# Patient Record
Sex: FEMALE
[De-identification: names, ages, dates, MRNs, and addresses within clinical notes are randomized per-mention and may not be internally consistent; named-entity substitution may affect disease eponyms.]

---

## 2022-08-04 ENCOUNTER — P2P PATIENT RECORD (OUTPATIENT)
Age: 47
End: 2022-08-04

## 2022-09-06 ENCOUNTER — LAB OUTSIDE AN ENCOUNTER (OUTPATIENT)
Dept: URBAN - METROPOLITAN AREA CLINIC 63 | Facility: CLINIC | Age: 47
End: 2022-09-06

## 2022-09-06 ENCOUNTER — WEB ENCOUNTER (OUTPATIENT)
Dept: URBAN - METROPOLITAN AREA CLINIC 63 | Facility: CLINIC | Age: 47
End: 2022-09-06

## 2022-09-06 ENCOUNTER — OFFICE VISIT (OUTPATIENT)
Dept: URBAN - METROPOLITAN AREA CLINIC 63 | Facility: CLINIC | Age: 47
End: 2022-09-06
Payer: COMMERCIAL

## 2022-09-06 VITALS
DIASTOLIC BLOOD PRESSURE: 80 MMHG | BODY MASS INDEX: 42.02 KG/M2 | TEMPERATURE: 97.2 F | SYSTOLIC BLOOD PRESSURE: 122 MMHG | HEART RATE: 71 BPM | WEIGHT: 252.2 LBS | OXYGEN SATURATION: 96 % | HEIGHT: 65 IN

## 2022-09-06 DIAGNOSIS — K52.9 CHRONIC DIARRHEA: ICD-10-CM

## 2022-09-06 DIAGNOSIS — K75.81 NASH (NONALCOHOLIC STEATOHEPATITIS): ICD-10-CM

## 2022-09-06 DIAGNOSIS — E78.2 MODERATE MIXED HYPERLIPIDEMIA NOT REQUIRING STATIN THERAPY: ICD-10-CM

## 2022-09-06 DIAGNOSIS — D50.9 IRON DEFICIENCY ANEMIA, UNSPECIFIED IRON DEFICIENCY ANEMIA TYPE: ICD-10-CM

## 2022-09-06 PROBLEM — 267434003: Status: ACTIVE | Noted: 2022-09-06

## 2022-09-06 PROCEDURE — 99204 OFFICE O/P NEW MOD 45 MIN: CPT | Performed by: INTERNAL MEDICINE

## 2022-09-06 NOTE — HPI-TODAY'S VISIT:
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  She saw her primary care physician and labs were ordered and she was found to have a mild iron deficiency anemia.  Her iron saturation was 11 and her hemoglobin was 12.2.  Also of significance is that she had a C-reactive protein of 9.9 and an MARY which was positive with a 1-320 titer.  She has not seen blood in the stool she not had any specific weight loss

## 2022-10-10 PROBLEM — 442685003: Status: ACTIVE | Noted: 2022-09-06

## 2022-10-14 ENCOUNTER — CLAIMS CREATED FROM THE CLAIM WINDOW (OUTPATIENT)
Dept: URBAN - METROPOLITAN AREA CLINIC 4 | Facility: CLINIC | Age: 47
End: 2022-10-14
Payer: COMMERCIAL

## 2022-10-14 ENCOUNTER — OFFICE VISIT (OUTPATIENT)
Dept: URBAN - METROPOLITAN AREA SURGERY CENTER 4 | Facility: SURGERY CENTER | Age: 47
End: 2022-10-14
Payer: COMMERCIAL

## 2022-10-14 ENCOUNTER — TELEPHONE ENCOUNTER (OUTPATIENT)
Dept: URBAN - METROPOLITAN AREA CLINIC 63 | Facility: CLINIC | Age: 47
End: 2022-10-14

## 2022-10-14 DIAGNOSIS — K64.0 GRADE I INTERNAL HEMORRHOIDS: ICD-10-CM

## 2022-10-14 DIAGNOSIS — D50.9 IRON DEFICIENCY ANEMIA, UNSPECIFIED IRON DEFICIENCY ANEMIA TYPE: ICD-10-CM

## 2022-10-14 DIAGNOSIS — K63.89 OTHER SPECIFIED DISEASES OF INTESTINE: ICD-10-CM

## 2022-10-14 DIAGNOSIS — K29.70 GASTRITIS, UNSPECIFIED, WITHOUT BLEEDING: ICD-10-CM

## 2022-10-14 DIAGNOSIS — R12 HEARTBURN: ICD-10-CM

## 2022-10-14 DIAGNOSIS — K31.89 OTHER DISEASES OF STOMACH AND DUODENUM: ICD-10-CM

## 2022-10-14 DIAGNOSIS — K29.51 UNSPECIFIED CHRONIC GASTRITIS WITH BLEEDING: ICD-10-CM

## 2022-10-14 PROBLEM — 95530000: Status: ACTIVE | Noted: 2022-10-14

## 2022-10-14 PROCEDURE — 45380 COLONOSCOPY AND BIOPSY: CPT | Performed by: INTERNAL MEDICINE

## 2022-10-14 PROCEDURE — 88312 SPECIAL STAINS GROUP 1: CPT | Performed by: PATHOLOGY

## 2022-10-14 PROCEDURE — G8907 PT DOC NO EVENTS ON DISCHARG: HCPCS | Performed by: INTERNAL MEDICINE

## 2022-10-14 PROCEDURE — 88313 SPECIAL STAINS GROUP 2: CPT | Performed by: PATHOLOGY

## 2022-10-14 PROCEDURE — 88342 IMHCHEM/IMCYTCHM 1ST ANTB: CPT | Performed by: PATHOLOGY

## 2022-10-14 PROCEDURE — G8918 PT W/O PREOP ORDER IV AB PRO: HCPCS | Performed by: INTERNAL MEDICINE

## 2022-10-14 PROCEDURE — 88305 TISSUE EXAM BY PATHOLOGIST: CPT | Performed by: PATHOLOGY

## 2022-10-14 PROCEDURE — 43239 EGD BIOPSY SINGLE/MULTIPLE: CPT | Performed by: INTERNAL MEDICINE

## 2022-10-14 RX ORDER — OMEPRAZOLE 40 MG/1
1 CAPSULE 30 MINUTES BEFORE MORNING MEAL CAPSULE, DELAYED RELEASE ORAL ONCE A DAY
Qty: 30 | OUTPATIENT
Start: 2022-10-14

## 2022-10-14 RX ORDER — SUCRALFATE 1 G/1
1 TABLET ON AN EMPTY STOMACH TABLET ORAL TID
Qty: 90 | Refills: 1 | OUTPATIENT

## 2022-10-18 PROBLEM — 87522002: Status: ACTIVE | Noted: 2022-09-06

## 2022-10-18 PROBLEM — 698352000 CHRONIC ANTRAL GASTRITIS WITH HEMORRHAGE: Status: ACTIVE | Noted: 2022-10-18

## 2022-10-27 ENCOUNTER — OFFICE VISIT (OUTPATIENT)
Dept: URBAN - METROPOLITAN AREA CLINIC 63 | Facility: CLINIC | Age: 47
End: 2022-10-27

## 2022-11-21 ENCOUNTER — ERX REFILL RESPONSE (OUTPATIENT)
Dept: URBAN - METROPOLITAN AREA CLINIC 63 | Facility: CLINIC | Age: 47
End: 2022-11-21

## 2022-11-21 RX ORDER — OMEPRAZOLE 40 MG/1
TAKE 1 CAPSULE BY MOUTH EVERY DAY 30 MINUTES BEFORE BREAKFAST CAPSULE, DELAYED RELEASE ORAL
Qty: 30 CAPSULE | Refills: 0 | OUTPATIENT

## 2022-11-21 RX ORDER — OMEPRAZOLE 40 MG/1
1 CAPSULE 30 MINUTES BEFORE MORNING MEAL CAPSULE, DELAYED RELEASE ORAL ONCE A DAY
Qty: 30 | OUTPATIENT

## 2024-03-22 ENCOUNTER — OV CON (OUTPATIENT)
Dept: URBAN - METROPOLITAN AREA CLINIC 124 | Facility: CLINIC | Age: 49
End: 2024-03-22
Payer: COMMERCIAL

## 2024-03-22 ENCOUNTER — LAB (OUTPATIENT)
Dept: URBAN - METROPOLITAN AREA CLINIC 124 | Facility: CLINIC | Age: 49
End: 2024-03-22

## 2024-03-22 VITALS
BODY MASS INDEX: 30.62 KG/M2 | WEIGHT: 183.8 LBS | SYSTOLIC BLOOD PRESSURE: 105 MMHG | HEIGHT: 65 IN | HEART RATE: 87 BPM | TEMPERATURE: 97.3 F | DIASTOLIC BLOOD PRESSURE: 64 MMHG

## 2024-03-22 DIAGNOSIS — D50.0 IRON DEFICIENCY ANEMIA DUE TO CHRONIC BLOOD LOSS: ICD-10-CM

## 2024-03-22 DIAGNOSIS — K76.0 FATTY LIVER: ICD-10-CM

## 2024-03-22 DIAGNOSIS — R19.5 LOOSE STOOLS: ICD-10-CM

## 2024-03-22 DIAGNOSIS — R76.8 ANA POSITIVE: ICD-10-CM

## 2024-03-22 DIAGNOSIS — K25.9 GASTRIC EROSION DETERMINED BY ENDOSCOPY: ICD-10-CM

## 2024-03-22 DIAGNOSIS — R19.4 CHANGE IN BOWEL HABITS: ICD-10-CM

## 2024-03-22 PROBLEM — 197321007: Status: ACTIVE | Noted: 2024-03-22

## 2024-03-22 PROCEDURE — 99204 OFFICE O/P NEW MOD 45 MIN: CPT | Performed by: INTERNAL MEDICINE

## 2024-03-22 RX ORDER — OMEPRAZOLE 40 MG/1
TAKE 1 CAPSULE BY MOUTH EVERY DAY 30 MINUTES BEFORE BREAKFAST CAPSULE, DELAYED RELEASE ORAL
Qty: 30 CAPSULE | Refills: 0 | Status: DISCONTINUED | COMMUNITY

## 2024-03-22 RX ORDER — SODIUM PICOSULFATE, MAGNESIUM OXIDE, AND ANHYDROUS CITRIC ACID 12; 3.5; 1 G/175ML; G/175ML; MG/175ML
175 ML THE FIRST DOSE THE EVENING BEFORE AND SECOND DOSE THE MORNING OF COLONOSCOPY LIQUID ORAL ONCE A DAY
Qty: 350 | OUTPATIENT
Start: 2024-03-22 | End: 2024-03-24

## 2024-03-22 RX ORDER — SUCRALFATE 1 G/1
1 TABLET ON AN EMPTY STOMACH TABLET ORAL TID
Qty: 90 | Refills: 1 | Status: DISCONTINUED | COMMUNITY

## 2024-03-22 NOTE — HPI-TODAY'S VISIT:
March 2024 visit: Prior workup in [place].  EGD in October 22 revealed chronic gastritis.  Colonoscopy in October 22 revealed a tortuous colon with internal hemorrhoids.  No celiac disease, no H. pylori infection, random colon biopsies were benign.Abdominal ultrasound September 22 revealed normal gallbladder, fatty liver.Labs from August 22 revealed a positive MARY with a titer of 1 is to 320, normal AST, ALT, bilirubin and alkaline phosphatase, low ferritin 11, elevated CRP level 9.9, hemoglobin 12.2, low MCV 75, normal WBC and normal platelet count. since 2021 was told she anemic. regular menstrual cycles for the most part. on semiglutide since april 2023. she is going to stop this because she met her weight loss goal.  No known history of colon cancer / GI malignancies / IBD in first degree family members. since past 1 -2 months has more diarrhea / vomiting.1-2 BM per day, but stools are loose and prior to having BM feels abdominal cramping. no nsaids. labs 2/2024 - hb 8, low mcv 64. normal lfts.  very scant rectal bleeding.

## 2024-03-22 NOTE — HPI-OTHER HISTORIES
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 She saw her primary care physician and labs were ordered and she was found to have a mild iron deficiency anemia. Her iron saturation was 11 and her hemoglobin was 12.2. Also of significance is that she had a C-reactive protein of 9.9 and an MARY which was positive with a 1-320 titer. She has not seen blood in the stool she not had any specific weight loss

## 2024-03-26 LAB
% SATURATION: 3
ANACHOICE(R) SCREEN: NEGATIVE
FERRITIN: 1
IRON BINDING CAPACITY: 417
IRON, TOTAL: 12
VITAMIN B12: 699

## 2024-03-27 LAB
ADENOVIRUS F 40/41: NOT DETECTED
CALPROTECTIN, STOOL - QDX: (no result)
CAMPYLOBACTER: NOT DETECTED
CLOSTRIDIUM DIFFICILE: NOT DETECTED
ENTAMOEBA HISTOLYTICA: NOT DETECTED
ENTEROAGGREGATIVE E.COLI: NOT DETECTED
ENTEROTOXIGENIC E.COLI: NOT DETECTED
ESCHERICHIA COLI O157: NOT DETECTED
GIARDIA LAMBLIA: NOT DETECTED
NOROVIRUS GI/GII: NOT DETECTED
ROTAVIRUS A: NOT DETECTED
SALMONELLA SPP.: NOT DETECTED
SHIGA-LIKE TOXIN PRODUCING E.COLI: NOT DETECTED
SHIGELLA SPP. / ENTEROINVASIVE E.COLI: NOT DETECTED
VIBRIO PARAHAEMOLYTICUS: NOT DETECTED
VIBRIO SPP.: NOT DETECTED
YERSINIA ENTEROCOLITICA: NOT DETECTED

## 2024-05-01 ENCOUNTER — OUT OF OFFICE VISIT (OUTPATIENT)
Dept: URBAN - METROPOLITAN AREA SURGERY CENTER 16 | Facility: SURGERY CENTER | Age: 49
End: 2024-05-01
Payer: COMMERCIAL

## 2024-05-01 ENCOUNTER — CLAIMS CREATED FROM THE CLAIM WINDOW (OUTPATIENT)
Dept: URBAN - METROPOLITAN AREA CLINIC 4 | Facility: CLINIC | Age: 49
End: 2024-05-01
Payer: COMMERCIAL

## 2024-05-01 DIAGNOSIS — D12.4 ADENOMATOUS POLYP OF DESCENDING COLON: ICD-10-CM

## 2024-05-01 DIAGNOSIS — D50.9 IRON (FE) DEFICIENCY ANEMIA: ICD-10-CM

## 2024-05-01 DIAGNOSIS — D12.4 ADENOMA OF DESCENDING COLON: ICD-10-CM

## 2024-05-01 DIAGNOSIS — K31.89 OTHER DISEASES OF STOMACH AND DUODENUM: ICD-10-CM

## 2024-05-01 DIAGNOSIS — D12.2 ADENOMATOUS POLYP OF ASCENDING COLON: ICD-10-CM

## 2024-05-01 DIAGNOSIS — D12.2 ADENOMA OF ASCENDING COLON: ICD-10-CM

## 2024-05-01 DIAGNOSIS — D50.0 1. ANEMIA, IRON DEFICIENCY FROM CHRONIC BLOOD LOSS:: ICD-10-CM

## 2024-05-01 DIAGNOSIS — D12.2 BENIGN NEOPLASM OF ASCENDING COLON: ICD-10-CM

## 2024-05-01 DIAGNOSIS — D12.4 BENIGN NEOPLASM OF DESCENDING COLON: ICD-10-CM

## 2024-05-01 PROCEDURE — 43239 EGD BIOPSY SINGLE/MULTIPLE: CPT | Performed by: INTERNAL MEDICINE

## 2024-05-01 PROCEDURE — 45380 COLONOSCOPY AND BIOPSY: CPT | Performed by: INTERNAL MEDICINE

## 2024-05-01 PROCEDURE — 00813 ANES UPR LWR GI NDSC PX: CPT | Performed by: ANESTHESIOLOGIST ASSISTANT

## 2024-05-01 PROCEDURE — 00813 ANES UPR LWR GI NDSC PX: CPT | Performed by: ANESTHESIOLOGY

## 2024-05-01 PROCEDURE — 45385 COLONOSCOPY W/LESION REMOVAL: CPT | Performed by: INTERNAL MEDICINE

## 2024-05-01 PROCEDURE — G8907 PT DOC NO EVENTS ON DISCHARG: HCPCS | Performed by: INTERNAL MEDICINE

## 2024-05-01 PROCEDURE — 88305 TISSUE EXAM BY PATHOLOGIST: CPT | Performed by: PATHOLOGY

## 2024-05-13 ENCOUNTER — TELEPHONE ENCOUNTER (OUTPATIENT)
Dept: URBAN - METROPOLITAN AREA CLINIC 25 | Facility: CLINIC | Age: 49
End: 2024-05-13

## 2024-06-07 ENCOUNTER — OFFICE VISIT (OUTPATIENT)
Dept: URBAN - METROPOLITAN AREA CLINIC 124 | Facility: CLINIC | Age: 49
End: 2024-06-07

## 2024-06-07 ENCOUNTER — TELEPHONE ENCOUNTER (OUTPATIENT)
Dept: URBAN - METROPOLITAN AREA CLINIC 25 | Facility: CLINIC | Age: 49
End: 2024-06-07

## 2024-06-07 NOTE — HPI-TODAY'S VISIT:
June 2024 visit: Labs from March 2024 included negative MARY, normal B12, severe iron deficiency with a ferritin of 1, stool studies revealed normal fecal calprotectin and no infectious pathogen. EGD and colonoscopy were performed in May 2024.  Colonoscopy revealed a small ascending colon polyp and a descending colon polyp with a inadequate bowel prep indicating constipation.  Both polyps were adenomatous. EGD revealed no endoscopic abnormalities, no celiac disease or H. pylori infection noted. Abdominal ultrasound from September 2022 had showed fatty liver.

## 2024-06-07 NOTE — HPI-OTHER HISTORIES
March 2024 visit: Prior workup in [place]. EGD in October 22 revealed chronic gastritis. Colonoscopy in October 22 revealed a tortuous colon with internal hemorrhoids. No celiac disease, no H. pylori infection, random colon biopsies were benign.Abdominal ultrasound September 22 revealed normal gallbladder, fatty liver.Labs from August 22 revealed a positive MARY with a titer of 1 is to 320, normal AST, ALT, bilirubin and alkaline phosphatase, low ferritin 11, elevated CRP level 9.9, hemoglobin 12.2, low MCV 75, normal WBC and normal platelet count. since 2021 was told she anemic. regular menstrual cycles for the most part. on semiglutide since april 2023. she is going to stop this because she met her weight loss goal.  No known history of colon cancer / GI malignancies / IBD in first degree family members. since past 1 -2 months has more diarrhea / vomiting.1-2 BM per day, but stools are loose and prior to having BM feels abdominal cramping. no nsaids. labs 2/2024 - hb 8, low mcv 64. normal lfts.  very scant rectal bleeding.  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 She saw her primary care physician and labs were ordered and she was found to have a mild iron deficiency anemia. Her iron saturation was 11 and her hemoglobin was 12.2. Also of significance is that she had a C-reactive protein of 9.9 and an MARY which was positive with a 1-320 titer. She has not seen blood in the stool she not had any specific weight loss